# Patient Record
Sex: MALE | Race: AMERICAN INDIAN OR ALASKA NATIVE
[De-identification: names, ages, dates, MRNs, and addresses within clinical notes are randomized per-mention and may not be internally consistent; named-entity substitution may affect disease eponyms.]

---

## 2022-09-15 ENCOUNTER — HOSPITAL ENCOUNTER (EMERGENCY)
Dept: HOSPITAL 5 - ED | Age: 32
LOS: 1 days | Discharge: HOME | End: 2022-09-16
Payer: COMMERCIAL

## 2022-09-15 DIAGNOSIS — D57.219: Primary | ICD-10-CM

## 2022-09-15 LAB
%HYPO/RBC NFR BLD AUTO: (no result) %
ANISOCYTOSIS BLD QL SMEAR: (no result)
BAND NEUTROPHILS # (MANUAL): 0 K/MM3
HCT VFR BLD CALC: 27.3 % (ref 35.5–45.6)
HGB BLD-MCNC: 8.9 GM/DL (ref 11.8–15.2)
HGB S BLD QL SOLY: (no result)
MCHC RBC AUTO-ENTMCNC: 33 % (ref 32–34)
MCV RBC AUTO: 77 FL (ref 84–94)
MYELOCYTES # (MANUAL): 0 K/MM3
PLATELET # BLD: 487 K/MM3 (ref 140–440)
POIKILOCYTOSIS BLD QL SMEAR: (no result)
PROMYELOCYTES # (MANUAL): 0 K/MM3
RBC # BLD AUTO: 3.54 M/MM3 (ref 3.65–5.03)
TARGETS BLD QL SMEAR: (no result)
TOTAL CELLS COUNTED BLD: 100

## 2022-09-15 PROCEDURE — 96372 THER/PROPH/DIAG INJ SC/IM: CPT

## 2022-09-15 PROCEDURE — 85025 COMPLETE CBC W/AUTO DIFF WBC: CPT

## 2022-09-15 PROCEDURE — 36415 COLL VENOUS BLD VENIPUNCTURE: CPT

## 2022-09-15 PROCEDURE — 99283 EMERGENCY DEPT VISIT LOW MDM: CPT

## 2022-09-15 PROCEDURE — 96374 THER/PROPH/DIAG INJ IV PUSH: CPT

## 2022-09-15 PROCEDURE — 85045 AUTOMATED RETICULOCYTE COUNT: CPT

## 2022-09-15 PROCEDURE — 85007 BL SMEAR W/DIFF WBC COUNT: CPT

## 2022-09-15 NOTE — EMERGENCY DEPARTMENT REPORT
Stated Complaint: SICKLE CELL





- HPI


History of Present Illness: 





Patient presents to ER for sickle cell crisis for the last 48 hours. No nausea 

no vomiting oxycodone 10mg not helping. Non feverf or chills.  No URI symptom. 





- Exam


Physical Exam: 





Alert and oriented times 3.  NAD No labored breathing.


VS stable.


gait nomal. 


MSE screening note: 


Focused history and physical exam performed.


Due to findings the following was ordered:





cbc retic count. 





ED Disposition for MSE


Condition: Stable

## 2022-09-16 VITALS — SYSTOLIC BLOOD PRESSURE: 120 MMHG | DIASTOLIC BLOOD PRESSURE: 84 MMHG

## 2022-09-16 NOTE — EMERGENCY DEPARTMENT REPORT
HPI





- General


Chief Complaint: Sickle Cell Crisis


Time Seen by Provider: 09/16/22 04:14





- HPI


HPI: 





32-year-old male with a history of sickle cell disease, currently followed up by

hematologist at [facility], nonHodgkin's Lymphoma, presents with what he 

states is a "flare" of his sickle cell disease.  He complains of pain to his 

left forearm x1 month as well as right wrist pain x48 hours.  He denies any 

falls or trauma, no chest pain shortness of breath difficulty breathing or 

palpitations.  Patient reports he has a known clot within his right-sided chest 

port but states he is on Eliquis and this was diagnosed within "the past 4 

months at [place]."  He states he is unsure of what is triggering his crisis but 

may likely be due to stress.  He reports he takes Percocet 10 mg tablets by 

mouth at home as needed for pain, and was written "for something stronger by my 

doctor but its not working."  He states that the aforementioned areas are typica

l for his pain crises.  Pain currently 8 out of 10.





ED Past Medical Hx





- Past Medical History


Previous Medical History?: Yes


Hx Sickle Cell Disease: Yes (SS)


Additional medical history: HODGKINS lymphoma





- Surgical History


Additional Surgical History: port right chest.





- Social History


Smoking Status: Never Smoker


Substance Use Type: None





- Medications


Home Medications: 


                                Home Medications











 Medication  Instructions  Recorded  Confirmed  Last Taken  Type


 


Folic Acid [Folvite] 1 mg PO QDAY #30 tablet 12/12/15 03/05/16 03/02/16 Rx


 


oxyCODONE [roxiCODONE] 5 mg PO Q6HR PRN #15 tablet 11/11/16  Unknown Rx














ED Review of Systems


ROS: 


Stated complaint: SICKLE CELL


Other details as noted in HPI





Comment: All other systems reviewed and negative





Physical Exam





- Physical Exam


Vital Signs: 


                                   Vital Signs











  09/15/22 09/16/22





  11:00 01:26


 


Temperature 97.5 F L 98.2 F


 


Pulse Rate 98 H 83


 


Respiratory 18 18





Rate  


 


Blood Pressure 123/79 107/73


 


O2 Sat by Pulse 97 100





Oximetry  











General: 


Gen: pt is well appearing, no acute distress


HEENT: Normocephalic atraumatic pupils equally round and reactive to light 

extraocular muscles intact sclera anicteric


Neck: Full range of motion, no midline spinal tenderness palpation, no JVD, no 

carotid bruits, no nuchal rigidity


CVS: S1-S2 regular rate and rhythm with no gallops rubs or murmurs, chest wall 

nontender


Pulmonary: Clear to auscultation bilaterally, no wheezes rales or rhonchi


Abdomen: Soft nondistended nontender no guarding or rebound tenderness, no 

palpable deformities or step-offs, normal active bowel sounds, no 

hepatosplenomegaly, no pulsatile masses


: Deferred


Extremities: No cyanosis no clubbing no edema, intact distal peripheral pulses,


Integumentary: Skin normal, no petechia no purpura no abscess no lacerations no 

evidence of trauma no evidence of infection


Neuro: Patient is awake alert and oriented to person place time situation, 

mentating well, cranial nerves II through XII intact, no focal neurodeficits, 

sensation grossly tact


Psych: Calm cooperative, mood affect normal





ED Course


                                   Vital Signs











  09/15/22 09/16/22





  11:00 01:26


 


Temperature 97.5 F L 98.2 F


 


Pulse Rate 98 H 83


 


Respiratory 18 18





Rate  


 


Blood Pressure 123/79 107/73


 


O2 Sat by Pulse 97 100





Oximetry  














- Reevaluation(s)


Reevaluation #1: 





09/16/22 05:31


Per BENJAMIN Fairbanks, multiple nurses have tried multiple times to place a peripheral IV 

in the patient, but they have been unsuccessful given that the patient has poor 

peripheral veins in which they may place a peripheral intravenous catheter.  

Patient has been offered alternatively to be given morphine intramuscularly for 

pain management.





ED Medical Decision Making





- Lab Data


Result diagrams: 


                                 09/15/22 11:32








- Medical Decision Making





32-year-old male with history of sickle cell disease, non-Hodgkin's lymphoma, 

presents for evaluation of pain to his left forearm and right wrist with what he

 states is an exacerbation of on his underlying sickle cell disease.  Vital 

signs stable.  Patient is hemodynamically stable and neurovascular intact.  Labs

 reviewed.  Multiple nurses have attempted multiple times to obtain peripheral 

IV access in this patient but have been unsuccessful.  Patient declined to have 

subsequent attempts made by nursing staff at this point.  Patient elected to be 

given as an alternative intramuscular morphine for management of his pain





Overall the patient is well-appearing.  He is tolerating p.o. challenge without 

difficulty or reproduction of worsening of his symptoms.  Additionally he denies

 any cardiopulmonary or neurovascular symptoms.  He has no signs or symptoms of 

a septic arthritis or any other limb threatening joint pathology.  No further 

emergent work-up warranted.  Patient stable for discharge to home.


Critical care attestation.: 


If time is entered above; I have spent that time in minutes in the direct care 

of this critically ill patient, excluding procedure time.








ED Disposition


Clinical Impression: 


 Sickle cell disease with crisis





Disposition: 01 HOME / SELF CARE / HOMELESS


Is pt being admited?: No


Does the pt Need Aspirin: No


Condition: Stable


Instructions:  Blood Smear Test


Additional Instructions: 


Follow up with your hematologist as soon as possible for reassessment and 

further management. This is very important.





Observe your symptoms very carefully.





Go to the nearest emergency department as soon as possible if you develop severe

 chest pain, shortness of breath, difficulty breathing, vomiting, any fever of 

100.4 Fahrenheit or higher, pain that is not consistent with your regular sickle

 cell crisis flare, or if any other new worrisome symptoms develop.